# Patient Record
Sex: MALE | Race: WHITE | NOT HISPANIC OR LATINO | Employment: FULL TIME | RURAL
[De-identification: names, ages, dates, MRNs, and addresses within clinical notes are randomized per-mention and may not be internally consistent; named-entity substitution may affect disease eponyms.]

---

## 2021-09-28 ENCOUNTER — OFFICE VISIT (OUTPATIENT)
Dept: FAMILY MEDICINE | Facility: CLINIC | Age: 26
End: 2021-09-28
Payer: COMMERCIAL

## 2021-09-28 VITALS
HEART RATE: 55 BPM | HEIGHT: 68 IN | OXYGEN SATURATION: 100 % | TEMPERATURE: 99 F | WEIGHT: 161.19 LBS | SYSTOLIC BLOOD PRESSURE: 106 MMHG | RESPIRATION RATE: 18 BRPM | BODY MASS INDEX: 24.43 KG/M2 | DIASTOLIC BLOOD PRESSURE: 69 MMHG

## 2021-09-28 DIAGNOSIS — Z00.00 WELLNESS EXAMINATION: Primary | ICD-10-CM

## 2021-09-28 DIAGNOSIS — Z13.220 SCREENING FOR LIPOID DISORDERS: ICD-10-CM

## 2021-09-28 DIAGNOSIS — Z13.1 SCREENING FOR DIABETES MELLITUS: ICD-10-CM

## 2021-09-28 DIAGNOSIS — Z85.6 HISTORY OF LEUKEMIA: ICD-10-CM

## 2021-09-28 PROCEDURE — 85025 COMPLETE CBC W/AUTO DIFF WBC: CPT | Mod: ,,, | Performed by: CLINICAL MEDICAL LABORATORY

## 2021-09-28 PROCEDURE — 82947 ASSAY GLUCOSE BLOOD QUANT: CPT | Mod: ,,, | Performed by: CLINICAL MEDICAL LABORATORY

## 2021-09-28 PROCEDURE — 80061 LIPID PANEL: ICD-10-PCS | Mod: ,,, | Performed by: CLINICAL MEDICAL LABORATORY

## 2021-09-28 PROCEDURE — 3078F DIAST BP <80 MM HG: CPT | Mod: ,,, | Performed by: NURSE PRACTITIONER

## 2021-09-28 PROCEDURE — 85025 CBC WITH DIFFERENTIAL: ICD-10-PCS | Mod: ,,, | Performed by: CLINICAL MEDICAL LABORATORY

## 2021-09-28 PROCEDURE — 99395 PR PREVENTIVE VISIT,EST,18-39: ICD-10-PCS | Mod: ,,, | Performed by: NURSE PRACTITIONER

## 2021-09-28 PROCEDURE — 83036 HEMOGLOBIN GLYCOSYLATED A1C: CPT | Mod: ,,, | Performed by: CLINICAL MEDICAL LABORATORY

## 2021-09-28 PROCEDURE — 1159F PR MEDICATION LIST DOCUMENTED IN MEDICAL RECORD: ICD-10-PCS | Mod: ,,, | Performed by: NURSE PRACTITIONER

## 2021-09-28 PROCEDURE — 3074F SYST BP LT 130 MM HG: CPT | Mod: ,,, | Performed by: NURSE PRACTITIONER

## 2021-09-28 PROCEDURE — 99395 PREV VISIT EST AGE 18-39: CPT | Mod: ,,, | Performed by: NURSE PRACTITIONER

## 2021-09-28 PROCEDURE — 1159F MED LIST DOCD IN RCRD: CPT | Mod: ,,, | Performed by: NURSE PRACTITIONER

## 2021-09-28 PROCEDURE — 3078F PR MOST RECENT DIASTOLIC BLOOD PRESSURE < 80 MM HG: ICD-10-PCS | Mod: ,,, | Performed by: NURSE PRACTITIONER

## 2021-09-28 PROCEDURE — 82947 GLUCOSE, RANDOM: ICD-10-PCS | Mod: ,,, | Performed by: CLINICAL MEDICAL LABORATORY

## 2021-09-28 PROCEDURE — 83036 HEMOGLOBIN A1C: ICD-10-PCS | Mod: ,,, | Performed by: CLINICAL MEDICAL LABORATORY

## 2021-09-28 PROCEDURE — 80061 LIPID PANEL: CPT | Mod: ,,, | Performed by: CLINICAL MEDICAL LABORATORY

## 2021-09-28 PROCEDURE — 3074F PR MOST RECENT SYSTOLIC BLOOD PRESSURE < 130 MM HG: ICD-10-PCS | Mod: ,,, | Performed by: NURSE PRACTITIONER

## 2021-09-28 PROCEDURE — 3008F PR BODY MASS INDEX (BMI) DOCUMENTED: ICD-10-PCS | Mod: ,,, | Performed by: NURSE PRACTITIONER

## 2021-09-28 PROCEDURE — 3008F BODY MASS INDEX DOCD: CPT | Mod: ,,, | Performed by: NURSE PRACTITIONER

## 2021-09-29 LAB
BASOPHILS # BLD AUTO: 0.07 K/UL (ref 0–0.2)
BASOPHILS NFR BLD AUTO: 0.9 % (ref 0–1)
CHOLEST SERPL-MCNC: 166 MG/DL (ref 0–200)
CHOLEST/HDLC SERPL: 3.9 {RATIO}
DIFFERENTIAL METHOD BLD: ABNORMAL
EOSINOPHIL # BLD AUTO: 0.21 K/UL (ref 0–0.5)
EOSINOPHIL NFR BLD AUTO: 2.8 % (ref 1–4)
ERYTHROCYTE [DISTWIDTH] IN BLOOD BY AUTOMATED COUNT: 12 % (ref 11.5–14.5)
EST. AVERAGE GLUCOSE BLD GHB EST-MCNC: 87 MG/DL
GLUCOSE SERPL-MCNC: 75 MG/DL (ref 74–106)
HBA1C MFR BLD HPLC: 5.2 % (ref 4.5–6.6)
HCT VFR BLD AUTO: 44.4 % (ref 40–54)
HDLC SERPL-MCNC: 43 MG/DL (ref 40–60)
HGB BLD-MCNC: 15 G/DL (ref 13.5–18)
IMM GRANULOCYTES # BLD AUTO: 0.02 K/UL (ref 0–0.04)
IMM GRANULOCYTES NFR BLD: 0.3 % (ref 0–0.4)
LDLC SERPL CALC-MCNC: 99 MG/DL
LDLC/HDLC SERPL: 2.3 {RATIO}
LYMPHOCYTES # BLD AUTO: 2.92 K/UL (ref 1–4.8)
LYMPHOCYTES NFR BLD AUTO: 38.9 % (ref 27–41)
MCH RBC QN AUTO: 28.7 PG (ref 27–31)
MCHC RBC AUTO-ENTMCNC: 33.8 G/DL (ref 32–36)
MCV RBC AUTO: 84.9 FL (ref 80–96)
MONOCYTES # BLD AUTO: 0.56 K/UL (ref 0–0.8)
MONOCYTES NFR BLD AUTO: 7.5 % (ref 2–6)
MPC BLD CALC-MCNC: 10.3 FL (ref 9.4–12.4)
NEUTROPHILS # BLD AUTO: 3.73 K/UL (ref 1.8–7.7)
NEUTROPHILS NFR BLD AUTO: 49.6 % (ref 53–65)
NONHDLC SERPL-MCNC: 123 MG/DL
NRBC # BLD AUTO: 0 X10E3/UL
NRBC, AUTO (.00): 0 %
PLATELET # BLD AUTO: 278 K/UL (ref 150–400)
RBC # BLD AUTO: 5.23 M/UL (ref 4.6–6.2)
TRIGL SERPL-MCNC: 119 MG/DL (ref 35–150)
VLDLC SERPL-MCNC: 24 MG/DL
WBC # BLD AUTO: 7.51 K/UL (ref 4.5–11)

## 2022-10-21 ENCOUNTER — OFFICE VISIT (OUTPATIENT)
Dept: FAMILY MEDICINE | Facility: CLINIC | Age: 27
End: 2022-10-21
Payer: COMMERCIAL

## 2022-10-21 VITALS
BODY MASS INDEX: 22.61 KG/M2 | DIASTOLIC BLOOD PRESSURE: 67 MMHG | HEIGHT: 68 IN | SYSTOLIC BLOOD PRESSURE: 105 MMHG | WEIGHT: 149.19 LBS | OXYGEN SATURATION: 95 % | TEMPERATURE: 99 F | HEART RATE: 75 BPM

## 2022-10-21 DIAGNOSIS — Z13.220 SCREENING FOR LIPOID DISORDERS: ICD-10-CM

## 2022-10-21 DIAGNOSIS — R53.83 FATIGUE, UNSPECIFIED TYPE: ICD-10-CM

## 2022-10-21 DIAGNOSIS — Z92.29 PERSONAL HISTORY OF HIGH RISK MEDICATION TREATMENT: ICD-10-CM

## 2022-10-21 DIAGNOSIS — Z00.01 ENCOUNTER FOR GENERAL ADULT MEDICAL EXAMINATION WITH ABNORMAL FINDINGS: Primary | ICD-10-CM

## 2022-10-21 DIAGNOSIS — Z85.72 HISTORY OF LYMPHOMA: ICD-10-CM

## 2022-10-21 DIAGNOSIS — Z13.1 SCREENING FOR DIABETES MELLITUS: ICD-10-CM

## 2022-10-21 PROCEDURE — 3074F PR MOST RECENT SYSTOLIC BLOOD PRESSURE < 130 MM HG: ICD-10-PCS | Mod: ,,, | Performed by: REGISTERED NURSE

## 2022-10-21 PROCEDURE — 84436 ASSAY OF TOTAL THYROXINE: CPT | Mod: ,,, | Performed by: CLINICAL MEDICAL LABORATORY

## 2022-10-21 PROCEDURE — 3078F PR MOST RECENT DIASTOLIC BLOOD PRESSURE < 80 MM HG: ICD-10-PCS | Mod: ,,, | Performed by: REGISTERED NURSE

## 2022-10-21 PROCEDURE — 3008F BODY MASS INDEX DOCD: CPT | Mod: ,,, | Performed by: REGISTERED NURSE

## 2022-10-21 PROCEDURE — 83550 IRON AND TIBC: ICD-10-PCS | Mod: ,,, | Performed by: CLINICAL MEDICAL LABORATORY

## 2022-10-21 PROCEDURE — 1160F RVW MEDS BY RX/DR IN RCRD: CPT | Mod: ,,, | Performed by: REGISTERED NURSE

## 2022-10-21 PROCEDURE — 99395 PREV VISIT EST AGE 18-39: CPT | Mod: 25,,, | Performed by: REGISTERED NURSE

## 2022-10-21 PROCEDURE — 3078F DIAST BP <80 MM HG: CPT | Mod: ,,, | Performed by: REGISTERED NURSE

## 2022-10-21 PROCEDURE — 80061 LIPID PANEL: CPT | Mod: ,,, | Performed by: CLINICAL MEDICAL LABORATORY

## 2022-10-21 PROCEDURE — 1159F PR MEDICATION LIST DOCUMENTED IN MEDICAL RECORD: ICD-10-PCS | Mod: ,,, | Performed by: REGISTERED NURSE

## 2022-10-21 PROCEDURE — 1159F MED LIST DOCD IN RCRD: CPT | Mod: ,,, | Performed by: REGISTERED NURSE

## 2022-10-21 PROCEDURE — 83550 IRON BINDING TEST: CPT | Mod: ,,, | Performed by: CLINICAL MEDICAL LABORATORY

## 2022-10-21 PROCEDURE — 1160F PR REVIEW ALL MEDS BY PRESCRIBER/CLIN PHARMACIST DOCUMENTED: ICD-10-PCS | Mod: ,,, | Performed by: REGISTERED NURSE

## 2022-10-21 PROCEDURE — 83540 IRON AND TIBC: ICD-10-PCS | Mod: ,,, | Performed by: CLINICAL MEDICAL LABORATORY

## 2022-10-21 PROCEDURE — 80050 GENERAL HEALTH PANEL: CPT | Mod: ,,, | Performed by: CLINICAL MEDICAL LABORATORY

## 2022-10-21 PROCEDURE — 82607 VITAMIN B12/FOLATE, SERUM PANEL: ICD-10-PCS | Mod: ,,, | Performed by: CLINICAL MEDICAL LABORATORY

## 2022-10-21 PROCEDURE — 82306 VITAMIN D: ICD-10-PCS | Mod: ,,, | Performed by: CLINICAL MEDICAL LABORATORY

## 2022-10-21 PROCEDURE — 82746 ASSAY OF FOLIC ACID SERUM: CPT | Mod: ,,, | Performed by: CLINICAL MEDICAL LABORATORY

## 2022-10-21 PROCEDURE — 82746 VITAMIN B12/FOLATE, SERUM PANEL: ICD-10-PCS | Mod: ,,, | Performed by: CLINICAL MEDICAL LABORATORY

## 2022-10-21 PROCEDURE — 84436 T4: ICD-10-PCS | Mod: ,,, | Performed by: CLINICAL MEDICAL LABORATORY

## 2022-10-21 PROCEDURE — 96372 THER/PROPH/DIAG INJ SC/IM: CPT | Mod: ,,, | Performed by: REGISTERED NURSE

## 2022-10-21 PROCEDURE — 3044F HG A1C LEVEL LT 7.0%: CPT | Mod: ,,, | Performed by: REGISTERED NURSE

## 2022-10-21 PROCEDURE — 3044F PR MOST RECENT HEMOGLOBIN A1C LEVEL <7.0%: ICD-10-PCS | Mod: ,,, | Performed by: REGISTERED NURSE

## 2022-10-21 PROCEDURE — 83540 ASSAY OF IRON: CPT | Mod: ,,, | Performed by: CLINICAL MEDICAL LABORATORY

## 2022-10-21 PROCEDURE — 99395 PR PREVENTIVE VISIT,EST,18-39: ICD-10-PCS | Mod: 25,,, | Performed by: REGISTERED NURSE

## 2022-10-21 PROCEDURE — 96372 PR INJECTION,THERAP/PROPH/DIAG2ST, IM OR SUBCUT: ICD-10-PCS | Mod: ,,, | Performed by: REGISTERED NURSE

## 2022-10-21 PROCEDURE — 82607 VITAMIN B-12: CPT | Mod: ,,, | Performed by: CLINICAL MEDICAL LABORATORY

## 2022-10-21 PROCEDURE — 80050 PR  GENERAL HEALTH PANEL: ICD-10-PCS | Mod: ,,, | Performed by: CLINICAL MEDICAL LABORATORY

## 2022-10-21 PROCEDURE — 3074F SYST BP LT 130 MM HG: CPT | Mod: ,,, | Performed by: REGISTERED NURSE

## 2022-10-21 PROCEDURE — 3008F PR BODY MASS INDEX (BMI) DOCUMENTED: ICD-10-PCS | Mod: ,,, | Performed by: REGISTERED NURSE

## 2022-10-21 PROCEDURE — 80061 LIPID PANEL: ICD-10-PCS | Mod: ,,, | Performed by: CLINICAL MEDICAL LABORATORY

## 2022-10-21 PROCEDURE — 82306 VITAMIN D 25 HYDROXY: CPT | Mod: ,,, | Performed by: CLINICAL MEDICAL LABORATORY

## 2022-10-21 RX ORDER — CYANOCOBALAMIN 1000 UG/ML
1000 INJECTION, SOLUTION INTRAMUSCULAR; SUBCUTANEOUS
Status: COMPLETED | OUTPATIENT
Start: 2022-10-21 | End: 2022-10-21

## 2022-10-21 RX ORDER — DEXTROAMPHETAMINE SULFATE, DEXTROAMPHETAMINE SACCHARATE, AMPHETAMINE SULFATE AND AMPHETAMINE ASPARTATE 5; 5; 5; 5 MG/1; MG/1; MG/1; MG/1
1 CAPSULE, EXTENDED RELEASE ORAL EVERY MORNING
COMMUNITY
Start: 2022-09-14

## 2022-10-21 RX ADMIN — CYANOCOBALAMIN 1000 MCG: 1000 INJECTION, SOLUTION INTRAMUSCULAR; SUBCUTANEOUS at 05:10

## 2022-10-21 NOTE — PROGRESS NOTES
OLIVIA Chaudhary        PATIENT NAME: Kings Phillips  : 1995  DATE: 10/21/22  MRN: [id number]      Billing Provider: OLIVIA Chaudhary  Level of Service: [place] PREVENTIVE VISIT,EST,18-39  Patient PCP Information       Provider PCP Type    OLIVIA Chaudhary General            Reason for Visit / Chief Complaint: Healthy You (Wellness visit/)       Update PCP  Update Chief Complaint         History of Present Illness / Problem Focused Workflow     HPI Mr. Phillips is a 28 y/o WM here for [employer] wellness exam. He reports having increased fatigue, decreased energy levels, and has noted unintended weight loss. In 2021 he weighed 161lbs and today he weighs 149lbs. He sees a provider at [facility] in [place] for ADHD and is currently taking Adderall as prescribed. He reports he has been on Adderall for about a year. Discussed side effect of Adderall being weight loss due to decreased appetite. Mr. Phillips has history of Lymphoma and reports being concerned cancer may have returned. Fatigue and weight loss are only reported symptoms at this time.     Review of Systems     Review of Systems   Constitutional:  Positive for activity change, fatigue and unexpected weight change.   HENT: Negative.     Respiratory: Negative.     Cardiovascular: Negative.    Gastrointestinal: Negative.    Genitourinary: Negative.    Musculoskeletal: Negative.    Neurological: Negative.    Psychiatric/Behavioral:  The patient is hyperactive.       Medical / Social / Family History     Past Medical History:   Diagnosis Date    Lymphoma        Past Surgical History:   Procedure Laterality Date    chemo port      in and out    SURGICAL REMOVAL OF LYMPH NODE         Social History  Mr. Kings Phillips  reports that he has never smoked. He has never used smokeless tobacco. He reports that he does not drink alcohol and does not use drugs.    Family History  Mr. Kings Phillips's family history includes Cancer in his father; No Known  Problems in his mother.    Medications and Allergies     Medications  Outpatient Medications Marked as Taking for the 10/21/22 encounter (Office Visit) with OLIVIA Chaudhary   Medication Sig Dispense Refill    ADDERALL XR 20 mg 24 hr capsule Take 1 tablet by mouth every morning.         Allergies  Review of patient's allergies indicates:   Allergen Reactions    Latex, natural rubber Rash       Physical Examination     Vitals:    10/21/22 1633   BP: 105/67   Pulse: 75   Temp: 98.5 °F (36.9 °C)     Physical Exam  Vitals and nursing note reviewed.   Constitutional:       Appearance: Normal appearance. He is normal weight.   HENT:      Head: Normocephalic and atraumatic.   Cardiovascular:      Rate and Rhythm: Normal rate and regular rhythm.      Heart sounds: Normal heart sounds.   Pulmonary:      Effort: Pulmonary effort is normal.      Breath sounds: Normal breath sounds.   Abdominal:      General: Bowel sounds are normal.   Musculoskeletal:         General: Normal range of motion.      Cervical back: Normal range of motion and neck supple.   Skin:     General: Skin is warm and dry.   Neurological:      Mental Status: He is alert and oriented to person, place, and time.        Assessment and Plan (including Health Maintenance)      Problem List  Smart Sets  Document Outside    Plan:   Encounter for general adult medical examination with abnormal findings    History of lymphoma  -     T4; Future; Expected date: 10/21/2022  -     TSH; Future; Expected date: 10/21/2022  -     Comprehensive Metabolic Panel; Future; Expected date: 10/21/2022  -     CBC Auto Differential; Future; Expected date: 10/21/2022  -     Urinalysis, Reflex to Urine Culture; Future; Expected date: 10/21/2022  -     Vitamin B12 & Folate; Future; Expected date: 10/21/2022  -     Iron and TIBC; Future; Expected date: 10/21/2022  -     Vitamin D; Future; Expected date: 10/21/2022    Screening for lipoid disorders  -     Lipid Panel; Future;  Expected date: 10/21/2022    Screening for diabetes mellitus  -     Glucose, Random; Future; Expected date: 10/21/2022  -     Hemoglobin A1C    Personal history of high risk medication treatment  -     T4; Future; Expected date: 10/21/2022  -     TSH; Future; Expected date: 10/21/2022  -     Comprehensive Metabolic Panel; Future; Expected date: 10/21/2022  -     CBC Auto Differential; Future; Expected date: 10/21/2022  -     Urinalysis, Reflex to Urine Culture; Future; Expected date: 10/21/2022  -     Vitamin B12 & Folate; Future; Expected date: 10/21/2022  -     Iron and TIBC; Future; Expected date: 10/21/2022  -     Vitamin D; Future; Expected date: 10/21/2022    Fatigue, unspecified type  -     cyanocobalamin injection 1,000 mcg     Will review lab work and treat accordingly. Patient to continue to monitor weight. Encouraged to take Adderall Monday-Friday and hold on Saturday and Sundays to see if appetite improves when not being used. Encouraged high protein diet, may use supplements such as protein powders or protein drinks between meals.     Health Maintenance Due   Topic Date Due    Hepatitis C Screening  Never done    COVID-19 Vaccine (1) Never done    HPV Vaccines (1 - Male 2-dose series) Never done    HIV Screening  Never done    TETANUS VACCINE  Never done    Influenza Vaccine (1) Never done       Problem List Items Addressed This Visit          Palliative Care    Personal history of high risk medication treatment    Relevant Orders    T4 (Completed)    TSH (Completed)    Comprehensive Metabolic Panel (Completed)    CBC Auto Differential (Completed)    Urinalysis, Reflex to Urine Culture    Vitamin B12 & Folate (Completed)    Iron and TIBC (Completed)    Vitamin D (Completed)     Other Visit Diagnoses       Encounter for general adult medical examination with abnormal findings    -  Primary    History of lymphoma        Relevant Orders    T4 (Completed)    TSH (Completed)    Comprehensive Metabolic Panel  (Completed)    CBC Auto Differential (Completed)    Urinalysis, Reflex to Urine Culture    Vitamin B12 & Folate (Completed)    Iron and TIBC (Completed)    Vitamin D (Completed)    Screening for lipoid disorders        Relevant Orders    Lipid Panel (Completed)    Screening for diabetes mellitus        Relevant Orders    Glucose, Random    Fatigue, unspecified type                The patient has no Health Maintenance topics of status Not Due    Future Appointments   Date Time Provider Department Center   10/23/2023  3:30 PM OLIVIA Chaudhary [facility] [place]        Patient Instructions   Take all medications as prescribed, do not stop or start any new medications without first consulting with your pcp or specialist. Make sure are drinking at least 6-8 glasses of water per day.   Be physically active for 30 minutes most days of the week. Break this up into three 10-minute sessions when pressed for time. Healthy movement may include walking, sports, dancing, yoga or running.  Eat a well-balanced, low-fat diet with lots of fruits, vegetables, and whole grains. Choose a diet that's low in saturated fat and cholesterol, and moderate in sugar, salt and total fat.  Avoid injury by wearing seatbelts and bike helmets, using smoke and carbon monoxide detectors in the home, and using street smarts when walking alone. If you own a gun, recognize the dangers of having a gun in your home. Use safety precautions at all times.  Don't smoke, and quit if you do. Ask your health care provider for help. [facility] offers a smoking cessation program.  If you drink alcohol, drink in moderation. Never drink before or when driving.   Ask someone you trust for help if you think you might be addicted to drugs or alcohol.  Help prevent sexually transmitted infections (STIs) and HIV/AIDS by using condoms every time you have sexual contact. Keep in mind, condoms are not 100 percent foolproof, so discuss STI screening with your provider.  Birth control methods other than condoms, such as pills and implants, won't protect you from STIs or HIV.  Brush your teeth after meals with a soft or medium bristled toothbrush. Also brush after drinking, before going to bed. Use dental floss daily.  Stay out of the sun, especially between 10 a.m. and 3 p.m. when the sun's harmful rays are strongest. Don't think you are safe if it is cloudy or if you are in the water, as harmful rays pass through both. Use a broad spectrum sunscreen that guards against both UVA and UVB rays, with a sun protection factor (SPF) of 15 or higher. Select sunglasses that block 99 to 100 percent of the sun's rays.  Learn to recognize and manage stress in your life. Signs of stress include trouble sleeping, frequent headaches and stomach problems; being angry a lot; and turning to food, drugs and alcohol to relieve stress. Good ways to deal with stress include regular exercise, healthy eating habits, and relaxation exercises such as deep breathing or meditation. Talking to trusted family members and friends can help a lot. Some women find that interacting with their [place] community is helpful in times of stress. Get enough sleep and rest - adults need around eight hours of sleep a night. Talk to your health care provider if you feel depressed for more than a few days. Depression is a treatable illness. Signs of depression include feeling empty and sad, crying a lot, loss of interest in life, and thoughts of death or suicide. If you or someone you know has thoughts of suicide, get help right away. Call 911, a local crisis center or (800) SUICIDE.   No follow-ups on file.     Signature:  OLIVIA Chaudhary      Date of encounter: 10/21/22

## 2022-10-22 ENCOUNTER — PATIENT MESSAGE (OUTPATIENT)
Dept: FAMILY MEDICINE | Facility: CLINIC | Age: 27
End: 2022-10-22
Payer: COMMERCIAL

## 2022-10-24 DIAGNOSIS — Z13.1 SCREENING FOR DIABETES MELLITUS: Primary | ICD-10-CM

## 2022-10-24 LAB
25(OH)D3 SERPL-MCNC: 25.8 NG/ML
ALBUMIN SERPL BCP-MCNC: 4.2 G/DL (ref 3.5–5)
ALBUMIN/GLOB SERPL: 1.3 {RATIO}
ALP SERPL-CCNC: 66 U/L (ref 45–115)
ALT SERPL W P-5'-P-CCNC: 22 U/L (ref 16–61)
ANION GAP SERPL CALCULATED.3IONS-SCNC: 10 MMOL/L (ref 7–16)
AST SERPL W P-5'-P-CCNC: 13 U/L (ref 15–37)
BASOPHILS # BLD AUTO: 0.05 K/UL (ref 0–0.2)
BASOPHILS NFR BLD AUTO: 0.7 % (ref 0–1)
BILIRUB SERPL-MCNC: 0.5 MG/DL (ref ?–1.2)
BUN SERPL-MCNC: 18 MG/DL (ref 7–18)
BUN/CREAT SERPL: 15 (ref 6–20)
CALCIUM SERPL-MCNC: 9.4 MG/DL (ref 8.5–10.1)
CHLORIDE SERPL-SCNC: 103 MMOL/L (ref 98–107)
CHOLEST SERPL-MCNC: 150 MG/DL (ref 0–200)
CHOLEST/HDLC SERPL: 3.5 {RATIO}
CO2 SERPL-SCNC: 29 MMOL/L (ref 21–32)
CREAT SERPL-MCNC: 1.2 MG/DL (ref 0.7–1.3)
DIFFERENTIAL METHOD BLD: ABNORMAL
EGFR (NO RACE VARIABLE) (RUSH/TITUS): 86 ML/MIN/1.73M²
EOSINOPHIL # BLD AUTO: 0.22 K/UL (ref 0–0.5)
EOSINOPHIL NFR BLD AUTO: 3.2 % (ref 1–4)
ERYTHROCYTE [DISTWIDTH] IN BLOOD BY AUTOMATED COUNT: 12.3 % (ref 11.5–14.5)
EST. AVERAGE GLUCOSE BLD GHB EST-MCNC: 94 MG/DL
FOLATE SERPL-MCNC: 8.4 NG/ML (ref 3.1–17.5)
GLOBULIN SER-MCNC: 3.3 G/DL (ref 2–4)
GLUCOSE SERPL-MCNC: 75 MG/DL (ref 74–106)
HBA1C MFR BLD HPLC: 5.4 % (ref 4.5–6.6)
HCT VFR BLD AUTO: 43.2 % (ref 40–54)
HDLC SERPL-MCNC: 43 MG/DL (ref 40–60)
HGB BLD-MCNC: 14.4 G/DL (ref 13.5–18)
IMM GRANULOCYTES # BLD AUTO: 0.01 K/UL (ref 0–0.04)
IMM GRANULOCYTES NFR BLD: 0.1 % (ref 0–0.4)
IRON SATN MFR SERPL: 30 % (ref 14–50)
IRON SERPL-MCNC: 78 ΜG/DL (ref 65–175)
LDLC SERPL CALC-MCNC: 91 MG/DL
LDLC/HDLC SERPL: 2.1 {RATIO}
LYMPHOCYTES # BLD AUTO: 2.86 K/UL (ref 1–4.8)
LYMPHOCYTES NFR BLD AUTO: 42.2 % (ref 27–41)
MCH RBC QN AUTO: 28.5 PG (ref 27–31)
MCHC RBC AUTO-ENTMCNC: 33.3 G/DL (ref 32–36)
MCV RBC AUTO: 85.5 FL (ref 80–96)
MONOCYTES # BLD AUTO: 0.47 K/UL (ref 0–0.8)
MONOCYTES NFR BLD AUTO: 6.9 % (ref 2–6)
MPC BLD CALC-MCNC: 10.3 FL (ref 9.4–12.4)
NEUTROPHILS # BLD AUTO: 3.16 K/UL (ref 1.8–7.7)
NEUTROPHILS NFR BLD AUTO: 46.9 % (ref 53–65)
NONHDLC SERPL-MCNC: 107 MG/DL
NRBC # BLD AUTO: 0 X10E3/UL
NRBC, AUTO (.00): 0 %
PLATELET # BLD AUTO: 301 K/UL (ref 150–400)
POTASSIUM SERPL-SCNC: 4.6 MMOL/L (ref 3.5–5.1)
PROT SERPL-MCNC: 7.5 G/DL (ref 6.4–8.2)
RBC # BLD AUTO: 5.05 M/UL (ref 4.6–6.2)
SODIUM SERPL-SCNC: 137 MMOL/L (ref 136–145)
T4 SERPL-MCNC: 9 ΜG/DL (ref 4.5–12.1)
TIBC SERPL-MCNC: 264 ΜG/DL (ref 250–450)
TRIGL SERPL-MCNC: 80 MG/DL (ref 35–150)
TSH SERPL DL<=0.005 MIU/L-ACNC: 1.75 UIU/ML (ref 0.36–3.74)
VIT B12 SERPL-MCNC: 2559 PG/ML (ref 193–986)
VLDLC SERPL-MCNC: 16 MG/DL
WBC # BLD AUTO: 6.77 K/UL (ref 4.5–11)

## 2022-10-24 PROCEDURE — 83036 HEMOGLOBIN A1C: ICD-10-PCS | Mod: ,,, | Performed by: CLINICAL MEDICAL LABORATORY

## 2022-10-24 PROCEDURE — 83036 HEMOGLOBIN GLYCOSYLATED A1C: CPT | Mod: ,,, | Performed by: CLINICAL MEDICAL LABORATORY

## 2022-10-25 ENCOUNTER — TELEPHONE (OUTPATIENT)
Dept: FAMILY MEDICINE | Facility: CLINIC | Age: 27
End: 2022-10-25
Payer: COMMERCIAL

## 2022-10-25 NOTE — TELEPHONE ENCOUNTER
----- Message from OLIVIA Chaudhary sent at 10/25/2022  7:45 AM CDT -----  Please call Mr. Phillips and let him know his thyroid tests all came back normal. His B12 level came back high but his folate level is normal. His iron level is technically normal but normal is  and his level is 78. I would like him to start over the counter iron tablets, Ferrous Sulfate 325mg po once per day or every other day for supplement. His Vitamin D level is 25.8 and sufficient is . Because he's on the lower end of normal, he can start an over the counter Vitamin D, 1,000iu or 2,000iu  is enough for now and we will repeat his lab work in about 6 months. Let him know his blood counts all look good. No problems with his white count, red cells, or platelets.

## 2022-11-22 NOTE — PATIENT INSTRUCTIONS
Take all medications as prescribed, do not stop or start any new medications without first consulting with your pcp or specialist. Make sure are drinking at least 6-8 glasses of water per day.   Be physically active for 30 minutes most days of the week. Break this up into three 10-minute sessions when pressed for time. Healthy movement may include walking, sports, dancing, yoga or running.  Eat a well-balanced, low-fat diet with lots of fruits, vegetables, and whole grains. Choose a diet that's low in saturated fat and cholesterol, and moderate in sugar, salt and total fat.  Avoid injury by wearing seatbelts and bike helmets, using smoke and carbon monoxide detectors in the home, and using street smarts when walking alone. If you own a gun, recognize the dangers of having a gun in your home. Use safety precautions at all times.  Don't smoke, and quit if you do. Ask your health care provider for help. [facility] offers a smoking cessation program.  If you drink alcohol, drink in moderation. Never drink before or when driving.   Ask someone you trust for help if you think you might be addicted to drugs or alcohol.  Help prevent sexually transmitted infections (STIs) and HIV/AIDS by using condoms every time you have sexual contact. Keep in mind, condoms are not 100 percent foolproof, so discuss STI screening with your provider. Birth control methods other than condoms, such as pills and implants, won't protect you from STIs or HIV.  Brush your teeth after meals with a soft or medium bristled toothbrush. Also brush after drinking, before going to bed. Use dental floss daily.  Stay out of the sun, especially between 10 a.m. and 3 p.m. when the sun's harmful rays are strongest. Don't think you are safe if it is cloudy or if you are in the water, as harmful rays pass through both. Use a broad spectrum sunscreen that guards against both UVA and UVB rays, with a sun protection factor (SPF) of 15 or higher. Select sunglasses that  block 99 to 100 percent of the sun's rays.  Learn to recognize and manage stress in your life. Signs of stress include trouble sleeping, frequent headaches and stomach problems; being angry a lot; and turning to food, drugs and alcohol to relieve stress. Good ways to deal with stress include regular exercise, healthy eating habits, and relaxation exercises such as deep breathing or meditation. Talking to trusted family members and friends can help a lot. Some women find that interacting with their [place] community is helpful in times of stress. Get enough sleep and rest - adults need around eight hours of sleep a night. Talk to your health care provider if you feel depressed for more than a few days. Depression is a treatable illness. Signs of depression include feeling empty and sad, crying a lot, loss of interest in life, and thoughts of death or suicide. If you or someone you know has thoughts of suicide, get help right away. Call 911, a local crisis center or (857) SUICIDE.

## 2023-04-12 ENCOUNTER — OFFICE VISIT (OUTPATIENT)
Dept: FAMILY MEDICINE | Facility: CLINIC | Age: 28
End: 2023-04-12
Payer: COMMERCIAL

## 2023-04-12 VITALS
DIASTOLIC BLOOD PRESSURE: 64 MMHG | RESPIRATION RATE: 18 BRPM | SYSTOLIC BLOOD PRESSURE: 101 MMHG | HEART RATE: 63 BPM | HEIGHT: 68 IN | OXYGEN SATURATION: 99 % | WEIGHT: 150 LBS | BODY MASS INDEX: 22.73 KG/M2 | TEMPERATURE: 97 F

## 2023-04-12 DIAGNOSIS — Z92.29 PERSONAL HISTORY OF HIGH RISK MEDICATION TREATMENT: Primary | ICD-10-CM

## 2023-04-12 DIAGNOSIS — Z85.72 HISTORY OF LYMPHOMA: ICD-10-CM

## 2023-04-12 PROCEDURE — 82306 VITAMIN D: ICD-10-PCS | Mod: ,,, | Performed by: CLINICAL MEDICAL LABORATORY

## 2023-04-12 PROCEDURE — 3008F BODY MASS INDEX DOCD: CPT | Mod: ,,, | Performed by: REGISTERED NURSE

## 2023-04-12 PROCEDURE — 82306 VITAMIN D 25 HYDROXY: CPT | Mod: ,,, | Performed by: CLINICAL MEDICAL LABORATORY

## 2023-04-12 PROCEDURE — 1160F RVW MEDS BY RX/DR IN RCRD: CPT | Mod: ,,, | Performed by: REGISTERED NURSE

## 2023-04-12 PROCEDURE — 3078F PR MOST RECENT DIASTOLIC BLOOD PRESSURE < 80 MM HG: ICD-10-PCS | Mod: ,,, | Performed by: REGISTERED NURSE

## 2023-04-12 PROCEDURE — 3074F PR MOST RECENT SYSTOLIC BLOOD PRESSURE < 130 MM HG: ICD-10-PCS | Mod: ,,, | Performed by: REGISTERED NURSE

## 2023-04-12 PROCEDURE — 85025 COMPLETE CBC W/AUTO DIFF WBC: CPT | Mod: ,,, | Performed by: CLINICAL MEDICAL LABORATORY

## 2023-04-12 PROCEDURE — 3078F DIAST BP <80 MM HG: CPT | Mod: ,,, | Performed by: REGISTERED NURSE

## 2023-04-12 PROCEDURE — 3074F SYST BP LT 130 MM HG: CPT | Mod: ,,, | Performed by: REGISTERED NURSE

## 2023-04-12 PROCEDURE — 99213 OFFICE O/P EST LOW 20 MIN: CPT | Mod: ,,, | Performed by: REGISTERED NURSE

## 2023-04-12 PROCEDURE — 1159F PR MEDICATION LIST DOCUMENTED IN MEDICAL RECORD: ICD-10-PCS | Mod: ,,, | Performed by: REGISTERED NURSE

## 2023-04-12 PROCEDURE — 1160F PR REVIEW ALL MEDS BY PRESCRIBER/CLIN PHARMACIST DOCUMENTED: ICD-10-PCS | Mod: ,,, | Performed by: REGISTERED NURSE

## 2023-04-12 PROCEDURE — 3008F PR BODY MASS INDEX (BMI) DOCUMENTED: ICD-10-PCS | Mod: ,,, | Performed by: REGISTERED NURSE

## 2023-04-12 PROCEDURE — 1159F MED LIST DOCD IN RCRD: CPT | Mod: ,,, | Performed by: REGISTERED NURSE

## 2023-04-12 PROCEDURE — 99213 PR OFFICE/OUTPT VISIT, EST, LEVL III, 20-29 MIN: ICD-10-PCS | Mod: ,,, | Performed by: REGISTERED NURSE

## 2023-04-12 PROCEDURE — 85025 CBC WITH DIFFERENTIAL: ICD-10-PCS | Mod: ,,, | Performed by: CLINICAL MEDICAL LABORATORY

## 2023-04-12 NOTE — PATIENT INSTRUCTIONS
Return for next appointment in 6 months, sooner if needed. Take all medications as prescribed. Do not stop or start any new medications without consulting with your provider.

## 2023-04-12 NOTE — PROGRESS NOTES
"   OLIVIA Chaudhary        PATIENT NAME: Kings Phillips  : 1995  DATE: 23  MRN: [id number]      Billing Provider: OLIVIA Chuadhary  Level of Service: [place] OFFICE/OUTPT VISIT, EST, LEVL III, 20-29 MIN  Patient PCP Information       Provider PCP Type    OLIVIA Chaudhary General          Reason for Visit / Chief Complaint: Follow-up (Wants a CBC HX cancer)     Update PCP  Update Chief Complaint         History of Present Illness / Problem Focused Workflow      HPI Mr. Phillips is a 28-year-old WM here for routine follow-up and repeat lab work. He has known history of Lymphoma with previous use of Chemotherapy. He presented several months ago with concerns regarding weight loss and change in appetite. He reported feeling more fatigued than usual and admitted to being "paranoid" due to prior medical history. Today, he states he is doing well. He has increased protein in his diet and has tried to be more active to build tolerance to activity. His weight is stable, no noted loss, he was 149lbs in October and is 150lbs today. He denies swollen or tender lymph nodes.     Review of Systems     Review of Systems   Constitutional:  Positive for fatigue.   Respiratory: Negative.     Cardiovascular: Negative.    Gastrointestinal: Negative.    Musculoskeletal: Negative.    Neurological: Negative.    Psychiatric/Behavioral:  Positive for decreased concentration. The patient is nervous/anxious.       Medical / Social / Family History     Past Medical History:   Diagnosis Date    Lymphoma      Past Surgical History:   Procedure Laterality Date    chemo port      in and out    SURGICAL REMOVAL OF LYMPH NODE       Social History  Mr. Kings Phillips  reports that he has never smoked. He has never used smokeless tobacco. He reports that he does not drink alcohol and does not use drugs.    Family History  Mr. Kings Phillips's family history includes Cancer in his father; No Known Problems in his mother.    Medications and " Allergies     Medications  Outpatient Medications Marked as Taking for the 4/12/23 encounter (Office Visit) with OLIVIA Chaudhary   Medication Sig Dispense Refill    ADDERALL XR 20 mg 24 hr capsule Take 1 tablet by mouth every morning.       Allergies  Review of patient's allergies indicates:   Allergen Reactions    Latex, natural rubber Rash     Physical Examination     Vitals:    04/12/23 1602   BP: 101/64   Pulse: 63   Resp: 18   Temp: 97.3 °F (36.3 °C)     Physical Exam  Vitals and nursing note reviewed.   Constitutional:       Appearance: Normal appearance. He is normal weight.   HENT:      Head: Normocephalic and atraumatic.      Nose: Nose normal.   Cardiovascular:      Rate and Rhythm: Normal rate and regular rhythm.      Heart sounds: Normal heart sounds.   Pulmonary:      Effort: Pulmonary effort is normal.      Breath sounds: Normal breath sounds.   Musculoskeletal:         General: Normal range of motion.      Cervical back: Normal range of motion and neck supple.   Skin:     General: Skin is warm and dry.   Neurological:      Mental Status: He is alert and oriented to person, place, and time.   Psychiatric:         Behavior: Behavior normal.      Assessment and Plan (including Health Maintenance)      Problem List  Smart Sets  Document Outside HM   Plan:   Personal history of high risk medication treatment  -     CBC Auto Differential; Future; Expected date: 04/12/2023  -     Vitamin D; Future; Expected date: 04/12/2023    History of lymphoma    He is followed by [facility] for ADHD, continues taking Adderall XR, states this works well helping him to focus and complete tasks. Will review lab work and follow-up accordingly.     Health Maintenance Due   Topic Date Due    Hepatitis C Screening  Never done    COVID-19 Vaccine (1) Never done    HIV Screening  Never done    TETANUS VACCINE  Never done    Influenza Vaccine (1) Never done     Problem List Items Addressed This Visit          Palliative  Care    Personal history of high risk medication treatment - Primary    Relevant Orders    CBC Auto Differential (Completed)    Vitamin D (Completed)     Other Visit Diagnoses       History of lymphoma              The patient has no Health Maintenance topics of status Not Due    Future Appointments   Date Time Provider Department Center   10/23/2023  3:30 PM OLIVIA Chaudhary [facility] [place] [place]      Patient Instructions   Return for next appointment in 6 months, sooner if needed. Take all medications as prescribed. Do not stop or start any new medications without consulting with your provider.   Follow up in about 6 months (around 10/12/2023).     Signature:  OLIVIA Chaudhary    Date of encounter: 4/12/23

## 2023-04-13 LAB
BASOPHILS # BLD AUTO: 0.07 K/UL (ref 0–0.2)
BASOPHILS NFR BLD AUTO: 1 % (ref 0–1)
DIFFERENTIAL METHOD BLD: ABNORMAL
EOSINOPHIL # BLD AUTO: 0.29 K/UL (ref 0–0.5)
EOSINOPHIL NFR BLD AUTO: 4 % (ref 1–4)
ERYTHROCYTE [DISTWIDTH] IN BLOOD BY AUTOMATED COUNT: 12.4 % (ref 11.5–14.5)
HCT VFR BLD AUTO: 41.9 % (ref 40–54)
HGB BLD-MCNC: 14 G/DL (ref 13.5–18)
IMM GRANULOCYTES # BLD AUTO: 0.02 K/UL (ref 0–0.04)
IMM GRANULOCYTES NFR BLD: 0.3 % (ref 0–0.4)
LYMPHOCYTES # BLD AUTO: 2.79 K/UL (ref 1–4.8)
LYMPHOCYTES NFR BLD AUTO: 38.6 % (ref 27–41)
MCH RBC QN AUTO: 28.5 PG (ref 27–31)
MCHC RBC AUTO-ENTMCNC: 33.4 G/DL (ref 32–36)
MCV RBC AUTO: 85.3 FL (ref 80–96)
MONOCYTES # BLD AUTO: 0.39 K/UL (ref 0–0.8)
MONOCYTES NFR BLD AUTO: 5.4 % (ref 2–6)
MPC BLD CALC-MCNC: 10 FL (ref 9.4–12.4)
NEUTROPHILS # BLD AUTO: 3.66 K/UL (ref 1.8–7.7)
NEUTROPHILS NFR BLD AUTO: 50.7 % (ref 53–65)
NRBC # BLD AUTO: 0 X10E3/UL
NRBC, AUTO (.00): 0 %
PLATELET # BLD AUTO: 282 K/UL (ref 150–400)
RBC # BLD AUTO: 4.91 M/UL (ref 4.6–6.2)
WBC # BLD AUTO: 7.22 K/UL (ref 4.5–11)

## 2023-04-14 ENCOUNTER — TELEPHONE (OUTPATIENT)
Dept: FAMILY MEDICINE | Facility: CLINIC | Age: 28
End: 2023-04-14
Payer: COMMERCIAL

## 2023-04-14 LAB — 25(OH)D3 SERPL-MCNC: 32.4 NG/ML

## 2023-04-14 NOTE — TELEPHONE ENCOUNTER
----- Message from OLIVIA Chaudhary sent at 4/14/2023 10:19 AM CDT -----  Please let Mr. Phillips know his lab work looks good. His Vitamin D level has increased and is at a good level. His blood counts do not show any abnormalities.